# Patient Record
Sex: MALE | Race: WHITE | NOT HISPANIC OR LATINO | Employment: FULL TIME | URBAN - METROPOLITAN AREA
[De-identification: names, ages, dates, MRNs, and addresses within clinical notes are randomized per-mention and may not be internally consistent; named-entity substitution may affect disease eponyms.]

---

## 2024-04-05 ENCOUNTER — APPOINTMENT (OUTPATIENT)
Dept: LAB | Facility: HOSPITAL | Age: 39
End: 2024-04-05
Payer: COMMERCIAL

## 2024-04-05 ENCOUNTER — OFFICE VISIT (OUTPATIENT)
Dept: FAMILY MEDICINE CLINIC | Facility: CLINIC | Age: 39
End: 2024-04-05
Payer: COMMERCIAL

## 2024-04-05 VITALS
HEART RATE: 88 BPM | SYSTOLIC BLOOD PRESSURE: 124 MMHG | BODY MASS INDEX: 38.36 KG/M2 | RESPIRATION RATE: 16 BRPM | DIASTOLIC BLOOD PRESSURE: 88 MMHG | HEIGHT: 76 IN | WEIGHT: 315 LBS

## 2024-04-05 DIAGNOSIS — Z83.3 FAMILY HISTORY OF DIABETES MELLITUS: ICD-10-CM

## 2024-04-05 DIAGNOSIS — R03.0 ELEVATED BP WITHOUT DIAGNOSIS OF HYPERTENSION: ICD-10-CM

## 2024-04-05 DIAGNOSIS — R20.2 PARESTHESIA OF BOTH FEET: ICD-10-CM

## 2024-04-05 DIAGNOSIS — E66.01 MORBID OBESITY DUE TO EXCESS CALORIES (HCC): ICD-10-CM

## 2024-04-05 DIAGNOSIS — L72.3 SEBACEOUS CYST: ICD-10-CM

## 2024-04-05 DIAGNOSIS — R03.0 ELEVATED BP WITHOUT DIAGNOSIS OF HYPERTENSION: Primary | ICD-10-CM

## 2024-04-05 PROBLEM — Z00.00 ENCOUNTER FOR ANNUAL PHYSICAL EXAM: Status: ACTIVE | Noted: 2024-04-05

## 2024-04-05 LAB
ALBUMIN SERPL BCP-MCNC: 4.7 G/DL (ref 3.5–5)
ALP SERPL-CCNC: 67 U/L (ref 34–104)
ALT SERPL W P-5'-P-CCNC: 21 U/L (ref 7–52)
ANION GAP SERPL CALCULATED.3IONS-SCNC: 10 MMOL/L (ref 4–13)
AST SERPL W P-5'-P-CCNC: 18 U/L (ref 13–39)
BASOPHILS # BLD AUTO: 0.02 THOUSANDS/ÂΜL (ref 0–0.1)
BASOPHILS NFR BLD AUTO: 0 % (ref 0–1)
BILIRUB SERPL-MCNC: 1.01 MG/DL (ref 0.2–1)
BUN SERPL-MCNC: 12 MG/DL (ref 5–25)
CALCIUM SERPL-MCNC: 9.8 MG/DL (ref 8.4–10.2)
CHLORIDE SERPL-SCNC: 96 MMOL/L (ref 96–108)
CHOLEST SERPL-MCNC: 178 MG/DL
CO2 SERPL-SCNC: 29 MMOL/L (ref 21–32)
CREAT SERPL-MCNC: 0.84 MG/DL (ref 0.6–1.3)
EOSINOPHIL # BLD AUTO: 0.13 THOUSAND/ÂΜL (ref 0–0.61)
EOSINOPHIL NFR BLD AUTO: 2 % (ref 0–6)
ERYTHROCYTE [DISTWIDTH] IN BLOOD BY AUTOMATED COUNT: 12.2 % (ref 11.6–15.1)
EST. AVERAGE GLUCOSE BLD GHB EST-MCNC: 252 MG/DL
GFR SERPL CREATININE-BSD FRML MDRD: 111 ML/MIN/1.73SQ M
GLUCOSE P FAST SERPL-MCNC: 141 MG/DL (ref 65–99)
HBA1C MFR BLD: 10.4 %
HCT VFR BLD AUTO: 40.3 % (ref 36.5–49.3)
HDLC SERPL-MCNC: 29 MG/DL
HGB BLD-MCNC: 13.4 G/DL (ref 12–17)
IMM GRANULOCYTES # BLD AUTO: 0.01 THOUSAND/UL (ref 0–0.2)
IMM GRANULOCYTES NFR BLD AUTO: 0 % (ref 0–2)
LDLC SERPL CALC-MCNC: 82 MG/DL (ref 0–100)
LYMPHOCYTES # BLD AUTO: 1.93 THOUSANDS/ÂΜL (ref 0.6–4.47)
LYMPHOCYTES NFR BLD AUTO: 34 % (ref 14–44)
MCH RBC QN AUTO: 26.9 PG (ref 26.8–34.3)
MCHC RBC AUTO-ENTMCNC: 33.3 G/DL (ref 31.4–37.4)
MCV RBC AUTO: 81 FL (ref 82–98)
MONOCYTES # BLD AUTO: 0.44 THOUSAND/ÂΜL (ref 0.17–1.22)
MONOCYTES NFR BLD AUTO: 8 % (ref 4–12)
NEUTROPHILS # BLD AUTO: 3.21 THOUSANDS/ÂΜL (ref 1.85–7.62)
NEUTS SEG NFR BLD AUTO: 56 % (ref 43–75)
NONHDLC SERPL-MCNC: 149 MG/DL
NRBC BLD AUTO-RTO: 0 /100 WBCS
PLATELET # BLD AUTO: 363 THOUSANDS/UL (ref 149–390)
PMV BLD AUTO: 9.2 FL (ref 8.9–12.7)
POTASSIUM SERPL-SCNC: 3.8 MMOL/L (ref 3.5–5.3)
PROT SERPL-MCNC: 8 G/DL (ref 6.4–8.4)
RBC # BLD AUTO: 4.99 MILLION/UL (ref 3.88–5.62)
SODIUM SERPL-SCNC: 135 MMOL/L (ref 135–147)
TRIGL SERPL-MCNC: 337 MG/DL
WBC # BLD AUTO: 5.74 THOUSAND/UL (ref 4.31–10.16)

## 2024-04-05 PROCEDURE — 80061 LIPID PANEL: CPT

## 2024-04-05 PROCEDURE — 83036 HEMOGLOBIN GLYCOSYLATED A1C: CPT

## 2024-04-05 PROCEDURE — 99204 OFFICE O/P NEW MOD 45 MIN: CPT | Performed by: FAMILY MEDICINE

## 2024-04-05 PROCEDURE — 36415 COLL VENOUS BLD VENIPUNCTURE: CPT

## 2024-04-05 PROCEDURE — 85025 COMPLETE CBC W/AUTO DIFF WBC: CPT

## 2024-04-05 PROCEDURE — 80053 COMPREHEN METABOLIC PANEL: CPT

## 2024-04-05 NOTE — ASSESSMENT & PLAN NOTE
Blood pressure high on arrival and lower after sitting. Will follow for now. Pt advised to continue low Na diet and to exercise on a regular basis. Will check labs.

## 2024-04-05 NOTE — ASSESSMENT & PLAN NOTE
CPE done. Will check labs. Pt advised to follow a well balanced, heart healthy diet and to exercise on a regular basis.

## 2024-04-05 NOTE — ASSESSMENT & PLAN NOTE
Patient has had it for past 6 weeks. No known diabetes and not a drinker. No back pain. Will check EMG.

## 2024-04-05 NOTE — PROGRESS NOTES
Depression Screening and Follow-up Plan: Patient was screened for depression during today's encounter. They screened negative with a PHQ-2 score of 1.    Assessment/Plan:         Problem List Items Addressed This Visit        Musculoskeletal and Integument    Sebaceous cyst     Patient has 8 cm cyst on right upper back. Will refer to General Surgery for management.          Relevant Orders    Ambulatory Referral to General Surgery       Orthopedic/Musculoskeletal    Paresthesia of both feet     Patient has had it for past 6 weeks. No known diabetes and not a drinker. No back pain. Will check EMG.          Relevant Orders    EMG 2 limb lower extremity       Other    Morbid obesity due to excess calories (HCC)     Discussed smaller portions, healthy snacks, and regular exercise.           Family history of diabetes mellitus     Will check A1C. Advised pt to follow a low carb diet and to exercise on a regular basis.         Relevant Orders    Comprehensive metabolic panel    Hemoglobin A1C    Elevated BP without diagnosis of hypertension - Primary     Blood pressure high on arrival and lower after sitting. Will follow for now. Pt advised to continue low Na diet and to exercise on a regular basis. Will check labs.          Relevant Orders    Lipid panel    Comprehensive metabolic panel    CBC and differential         Subjective:      Patient ID: Arthur Garrison is a 38 y.o. male.    Patient here for new patient exam. Has cyst on upper right back and painful. Hasn't drained yet. Just noticed it a few days ago. Not painful. Patient has also had decreased sensitivity in bilateral feet for past 6 weeks. Getting a little better. Worse in AM. No back pain. Patient has FH of DM. No chest pain or shortness of breath. No headaches. Not a smoker. Rare ETOH use. Exercises at times.         The following portions of the patient's history were reviewed and updated as appropriate:   Past Medical History:  He has no past medical  "history on file.,  _______________________________________________________________________  Medical Problems:  does not have any pertinent problems on file.,  _______________________________________________________________________  Past Surgical History:   has a past surgical history that includes Stella tooth extraction (Bilateral).,  _______________________________________________________________________  Family History:  family history includes Diabetes in his father and mother.,  _______________________________________________________________________  Social History:   reports that he has never smoked. He has never used smokeless tobacco. He reports current alcohol use. He reports that he does not use drugs.,  _______________________________________________________________________  Allergies:  has no allergies on file..  _______________________________________________________________________  No current outpatient medications on file.     No current facility-administered medications for this visit.     _______________________________________________________________________  Review of Systems   Constitutional:  Negative for fatigue and unexpected weight change.   Respiratory:  Negative for cough and shortness of breath.    Cardiovascular:  Negative for chest pain.   Gastrointestinal:  Negative for abdominal pain, constipation, diarrhea and vomiting.   Musculoskeletal:  Negative for arthralgias.   Skin:         Cyst on back    Neurological:  Positive for weakness. Negative for dizziness and headaches.   Psychiatric/Behavioral:  Negative for dysphoric mood. The patient is not nervous/anxious.          Objective:  Vitals:    04/05/24 1336 04/05/24 1358   BP: 136/100 124/88   BP Location: Left arm Left arm   Patient Position: Sitting Sitting   Cuff Size: Large Large   Pulse: 88    Resp: 16    Weight: (!) 152 kg (335 lb)    Height: 6' 3.6\" (1.92 m)      Body mass index is 41.21 kg/m².     Physical Exam  Vitals and " nursing note reviewed.   Constitutional:       Appearance: Normal appearance. He is well-developed. He is obese.   Neck:      Thyroid: No thyromegaly.   Cardiovascular:      Rate and Rhythm: Normal rate and regular rhythm.      Heart sounds: Normal heart sounds. No murmur heard.  Pulmonary:      Effort: Pulmonary effort is normal. No respiratory distress.      Breath sounds: Normal breath sounds. No wheezing.   Musculoskeletal:      Cervical back: Normal range of motion and neck supple.      Right lower leg: No edema.      Left lower leg: No edema.      Comments: 8 cm cyst right lateral thoracic area   Lymphadenopathy:      Cervical: No cervical adenopathy.   Neurological:      Mental Status: He is alert and oriented to person, place, and time.      Cranial Nerves: No cranial nerve deficit.      Sensory: Sensory deficit (b/l feet) present.   Psychiatric:         Mood and Affect: Mood normal.         Behavior: Behavior normal.         Thought Content: Thought content normal.         Judgment: Judgment normal.

## 2024-04-10 ENCOUNTER — OFFICE VISIT (OUTPATIENT)
Dept: SURGERY | Facility: CLINIC | Age: 39
End: 2024-04-10
Payer: COMMERCIAL

## 2024-04-10 VITALS
HEIGHT: 75 IN | HEART RATE: 110 BPM | BODY MASS INDEX: 39.17 KG/M2 | TEMPERATURE: 100 F | DIASTOLIC BLOOD PRESSURE: 78 MMHG | WEIGHT: 315 LBS | OXYGEN SATURATION: 98 % | SYSTOLIC BLOOD PRESSURE: 132 MMHG

## 2024-04-10 DIAGNOSIS — L02.212 BACK ABSCESS: Primary | ICD-10-CM

## 2024-04-10 DIAGNOSIS — L72.3 SEBACEOUS CYST: ICD-10-CM

## 2024-04-10 PROCEDURE — 10061 I&D ABSCESS COMP/MULTIPLE: CPT | Performed by: SURGERY

## 2024-04-10 PROCEDURE — 99204 OFFICE O/P NEW MOD 45 MIN: CPT | Performed by: SURGERY

## 2024-04-10 RX ORDER — AMOXICILLIN AND CLAVULANATE POTASSIUM 875; 125 MG/1; MG/1
1 TABLET, FILM COATED ORAL EVERY 12 HOURS SCHEDULED
Qty: 14 TABLET | Refills: 0 | Status: SHIPPED | OUTPATIENT
Start: 2024-04-10 | End: 2024-04-17

## 2024-04-10 NOTE — PROGRESS NOTES
"Assessment/Plan: Incision and drainage was done under local anesthetic with thorough debridement of the abscess cavity.  Bleeding was controlled with electrocautery.  Packing was done.  Wound care instructions provided.  Antibiotic prescription sent to the pharmacy.  Patient will follow-up in the office in 1 week.    No problem-specific Assessment & Plan notes found for this encounter.       Diagnoses and all orders for this visit:    Back abscess    Sebaceous cyst  -     Ambulatory Referral to General Surgery          Subjective:      Patient ID: Arthur Garrison is a 38 y.o. male.    38-year-old male patient came to my office with complaints of a swelling which is painful over his right upper back.  He says he had a swelling in the past which became bigger and painful and red in last few days.  He does complain of some drainage from the swelling.  No nausea or vomiting.  He had subjective fever.  Patient is not on antibiotics.  He has no known allergies.        The following portions of the patient's history were reviewed and updated as appropriate: allergies, current medications, past family history, past medical history, past social history, past surgical history, and problem list.    Review of Systems   Constitutional: Negative.    HENT: Negative.     Eyes: Negative.    Respiratory: Negative.     Cardiovascular: Negative.    Gastrointestinal: Negative.    Endocrine: Negative.    Genitourinary: Negative.    Musculoskeletal: Negative.    Allergic/Immunologic: Negative.    Neurological: Negative.    Hematological: Negative.    Psychiatric/Behavioral: Negative.           Objective:      /78 (BP Location: Left arm, Patient Position: Sitting, Cuff Size: Standard)   Pulse (!) 110   Temp 100 °F (37.8 °C) (Tympanic)   Ht 6' 3\" (1.905 m)   Wt (!) 153 kg (338 lb)   SpO2 98%   BMI 42.25 kg/m²          Physical Exam  Vitals reviewed.   Constitutional:       Appearance: He is obese.   HENT:      Head: Normocephalic " "and atraumatic.      Nose: Nose normal.      Mouth/Throat:      Mouth: Mucous membranes are moist.   Eyes:      Pupils: Pupils are equal, round, and reactive to light.   Cardiovascular:      Rate and Rhythm: Normal rate.   Pulmonary:      Effort: Pulmonary effort is normal.   Abdominal:      Palpations: Abdomen is soft.   Musculoskeletal:         General: Normal range of motion.      Cervical back: Normal range of motion.        Back:       Comments: About 6 to 7 cm abscess.  There were multiple draining sinuses on the top.  There was a satellite abscess which was superficial.  Superficial cellulitis with induration present in the surrounding area.   Neurological:      Mental Status: He is alert.       Incision and Drainage    Date/Time: 4/10/2024 2:30 PM    Performed by: Omkar Rosario MD  Authorized by: Omkar Rosario MD  Universal Protocol:  Consent: Written consent obtained.  Consent given by: patient  Time out: Immediately prior to procedure a \"time out\" was called to verify the correct patient, procedure, equipment, support staff and site/side marked as required.  Patient understanding: patient states understanding of the procedure being performed  Patient consent: the patient's understanding of the procedure matches consent given  Patient identity confirmed: verbally with patient    Patient location:  Clinic  Location:     Type:  Abscess    Location:  Trunk    Trunk location:  Back  Pre-procedure details:     Skin preparation:  Chloraprep  Anesthesia (see MAR for exact dosages):     Anesthesia method:  Local infiltration    Local anesthetic:  Lidocaine 1% WITH epi  Procedure details:     Complexity:  Complex    Needle aspiration: yes      Incision types:  Single with marsupialization    Scalpel blade:  11    Approach:  Open    Incision depth:  Subcutaneous and deep    Wound management:  Irrigated with saline, probed and deloculated and debrided    Drainage:  Purulent    Drainage amount:  Copious    Wound " treatment:  Wound left open    Packing materials:  Wick placed  Post-procedure details:     Patient tolerance of procedure:  Tolerated well, no immediate complications

## 2024-04-12 ENCOUNTER — OFFICE VISIT (OUTPATIENT)
Dept: FAMILY MEDICINE CLINIC | Facility: CLINIC | Age: 39
End: 2024-04-12
Payer: COMMERCIAL

## 2024-04-12 VITALS
OXYGEN SATURATION: 97 % | HEIGHT: 76 IN | SYSTOLIC BLOOD PRESSURE: 128 MMHG | WEIGHT: 315 LBS | BODY MASS INDEX: 38.36 KG/M2 | HEART RATE: 87 BPM | TEMPERATURE: 98.3 F | DIASTOLIC BLOOD PRESSURE: 82 MMHG

## 2024-04-12 DIAGNOSIS — E11.9 DIABETES MELLITUS WITHOUT COMPLICATION (HCC): Primary | ICD-10-CM

## 2024-04-12 DIAGNOSIS — R20.2 PARESTHESIA OF BOTH FEET: ICD-10-CM

## 2024-04-12 DIAGNOSIS — E78.5 DYSLIPIDEMIA: ICD-10-CM

## 2024-04-12 PROBLEM — Z83.3 FAMILY HISTORY OF DIABETES MELLITUS: Status: RESOLVED | Noted: 2024-04-05 | Resolved: 2024-04-12

## 2024-04-12 PROBLEM — R73.03 PREDIABETES: Status: ACTIVE | Noted: 2024-04-12

## 2024-04-12 PROCEDURE — 99214 OFFICE O/P EST MOD 30 MIN: CPT | Performed by: FAMILY MEDICINE

## 2024-04-12 RX ORDER — METFORMIN HYDROCHLORIDE 750 MG/1
750 TABLET, EXTENDED RELEASE ORAL
Qty: 90 TABLET | Refills: 1 | Status: SHIPPED | OUTPATIENT
Start: 2024-04-12

## 2024-04-12 NOTE — ASSESSMENT & PLAN NOTE
A1C 10.4 in April 2024. Will start medication. Advised pt to follow a low carb diet and to exercise on a regular basis. Foot exam done today. Eye exam is up to date.

## 2024-04-12 NOTE — ASSESSMENT & PLAN NOTE
LDL 82, HDL 29, Tgs 337 in April 2024. Patient likely has Metabolic Syndrome. Advised pt to follow a low cholesterol diet and to exercise on a regular basis. Recheck in 3 months.

## 2024-04-12 NOTE — PATIENT INSTRUCTIONS
Meal Planning with Diabetes Exchanges   AMBULATORY CARE:   Diabetes exchanges  are servings of food that contain similar amounts of carbohydrate, fat, protein, and calories within a food group. The exchanges can be used to develop a healthy meal plan that helps to keep your blood sugar within the recommended levels. A meal plan with the right amount of carbohydrates is especially important. Your blood sugar naturally rises after you eat carbohydrates. Too many carbohydrates in 1 meal or snack can raise your blood sugar level. Carbohydrates are found in starches, fruit, milk, yogurt, and sweets.  Call your doctor or diabetes care provider if:   You have high blood sugar levels during a certain time of day, or almost all of the time.    You often have low blood sugar levels.    You have questions or concerns about your condition or care.    Create a meal plan with exchanges:  A dietitian will work with you to develop a healthy meal plan that is right for you. This meal plan will include the amount of exchanges you can have from each food group throughout the day. Follow your meal plan by keeping track of the amount of exchanges you eat for each meal and snack. Your meal plan will be based on your age, weight, blood sugar levels, medicine, and activity level.  Starch food group exchanges:  Each exchange below contains about 15 grams of carbohydrate , 3 grams of protein, 1 gram of fat, and 80 calories.  1 ounce of white, whole wheat or rye bread (1 slice)    1 ounce of bagel (about ¼ of a bagel)    1 6-inch flour or corn tortilla or 1 4-inch pancake (about ¼ inch thick)    ? cup of cooked pasta or rice    ¾ cup of dry, ready-to-eat cereal with no sugar added    ½ cup of cooked cereal, such as oatmeal    3 kristal cracker squares or 8 animal crackers    6 saltine-type crackers    3 cups of popcorn or ¾ ounce of pretzels    Starchy vegetables and cooked legumes:      ½ cup of corn, green peas, sweet potatoes, or mashed  potatoes    ¼ of a large baked potato    1 cup of acorn, butternut squash, or pumpkin    ½ cup of beans, lentils, or peas (such as zuluaga, kidney, or black-eyed)    ? cup of lima beans    Fruit group exchanges:  Each exchange contains about 15 grams of carbohydrate  and 60 calories.  1 small (4 ounce) apple, banana orange, or nectarine    ½ cup of canned or fresh fruit    ½ cup (4 ounces) of unsweetened fruit juice    2 tablespoons of dried fruit    Milk group exchanges:  Each exchange contains about 12 grams of carbohydrate  and 8 grams of protein. The amount of fat and calories in each serving depends on the type of milk (such as whole, low-fat, or fat-free).  1 cup fat-free or low-fat milk    ¾ cup of plain, nonfat yogurt    1 cup fat-free, flavored yogurt with artificial (no calorie) sweetener    Non-starchy vegetable group exchanges:  Each exchange contains about 5 grams of carbohydrate , 2 grams of protein, and 25 calories. Examples include beets, broccoli, cabbage, carrots, cauliflower, cucumber, mushrooms, tomatoes, and zucchini.  ½ cup of cooked vegetables or 1 cup of raw vegetables    ½ cup of vegetable juice    Meat and meat substitute group exchanges:  Each exchange of a lean meat  listed below contains about 7 grams of protein, 0 to 3 grams of fat, and 45 calories. The meat and meat substitutes food group does not contain any carbohydrates. Medium and high-fat meats have more calories.  1 ounce of chicken or turkey without skin, or 1 ounce of fish (not breaded or fried)    1 ounce of lean beef, pork, or lamb    1-inch cube or 1 ounce of low-fat cheese    2 egg whites or ¼ cup of egg substitute    ½ cup of tofu    Sweets, desserts, and other carbohydrate group exchanges:   Sweets and other desserts:  Each exchange has about 15 grams of carbohydrate .    1 ounce of jonn food cake or 2-inch square cake (unfrosted)    2 small cookies    ½ cup of sugar-free, fat-free ice cream    1 tablespoon of syrup, jam,  jelly, table sugar, or honey    Combination foods:     1 cup of an entrée, such as lasagna, spaghetti with meatballs, macaroni and cheese, and chili with beans (each serving counts as 2 carbohydrate exchanges )    1 cup of tomato or vegetable beef soup (each serving counts as 1 carbohydrate exchange )    Fat group exchanges:  Each exchange contains 5 grams of fat and 45 calories.  1 teaspoon of oil (such as canola, olive, or corn oil)    6 almonds or cashews, 10 peanuts, or 4 pecan halves    2 tablespoons of avocado    ½ tablespoon of peanut butter    1 teaspoon of regular margarine or 2 teaspoons of low-fat margarine    1 teaspoon of regular butter or 1 tablespoon of low-fat butter    1 teaspoon of regular mayonnaise or 1 tablespoon of low-fat mayonnaise    1 tablespoon of regular salad dressing or 2 tablespoons of low-fat salad dressing    Free foods:  The foods on this list are called free foods because they have very few calories. Free foods usually do not increase your blood sugar if you limit them.  1 tablespoon of catsup or taco sauce    ¼ cup of salsa    2 tablespoons of sugar-free syrup or 2 teaspoons of light jam or jelly    1 tablespoon of fat-free salad dressing    4 tablespoons of fat-free margarine or fat-free mayonnaise    Sugar-free drinks: diet soda, sugar-free drink mixes, or mineral water    Low-sodium bouillon or fat-free broth    Mustard    Seasonings such as spices, herbs, and garlic    Sugar-free gelatin without added fruit    Other healthy nutrition guidelines:   Limit drinks with sugar substitutes.  Your dietitian or healthcare provider will encourage you to drink water. Water helps your kidneys to function properly. Ask how much water you should drink every day.    Eat more fiber.  Choose foods that are good sources of fiber, such as fruits, vegetables, and whole grains. Cereals that contain 5 or more grams of fiber per serving are good sources of fiber. Legumes such as garbanzo, zuluaga  beans, kidney beans, and lentils are also good sources.         Limit fat.  Ask your dietitian or healthcare provider how much fat you should eat each day. Choose foods low in fat, saturated fat, trans fat, and cholesterol. Examples include turkey or chicken without the skin, fish, lean cuts of meat, and beans. Low-fat dairy foods, such as low-fat or fat-free milk and low-fat yogurt are also good choices. Omega-3 fatty acids are healthy fats that are found in canola oil, soybean oil and fatty fish. [place], albacore tuna, and sardines are good sources of omega 3 fatty acids. Eat 2 servings of these types of fish each week. Do not eat fried fish.         Limit sugar.  Sugar and sweets must be counted toward the carbohydrate exchanges that you can have within your meal plan. Limit sugar and sweets because they are usually also high in calories and fat. Eat smaller portions of sweets by sharing a dessert or asking for a child-size portion at a restaurant.    Limit sodium  (salt) to about 2,300 mg per day. You may need to eat even less sodium if you have certain medical conditions. Foods high in sodium include soy sauce, potato chips, and soup.         Limit alcohol.  Ask your healthcare provider if it is safe for you to drink alcohol. If alcohol is safe for you to have, eat a meal when you drink alcohol. If you drink alcohol on an empty stomach, your blood sugar may drop to a low level. Women 21 years or older and men 65 years or older should limit alcohol to 1 drink a day. Men aged 21 to 64 years should limit alcohol to 2 drinks a day. A drink of alcohol is 5 ounces of wine, 12 ounces of beer, or 1½ ounces of liquor.    Other ways to manage diabetes:   Control your blood sugar level.  Test your blood sugar level regularly and keep a record of the results. Ask your healthcare provider when and how often to test your blood sugar. You may need to check your blood sugar level at least 3 times each day.    Talk to your  healthcare provider about your weight.  Ask if you need to lose weight, and how much you need to lose. If you are overweight, you may need to make other changes to lose weight. Ask your healthcare provider to help you create a weight loss program.    Get regular physical activity.  Physical activity can help decrease your blood sugar level. It can also help to decrease your risk for heart disease and help you lose weight. Adults should have moderate intensity physical activity for at least 150 minutes every week. Spread the amount of activity over at least 3 days a week. Do not skip more than 2 days in a row. Children should get at least 60 minutes of moderate physical activity on most days of the week. Examples of moderate physical activity include brisk walking, running, and swimming. Do not sit for longer than 30 minutes. Work with your healthcare provider to create a plan for physical activity.       © Copyright [employer] 2023 Information is for End User's use only and may not be sold, redistributed or otherwise used for commercial purposes.  The above information is an  only. It is not intended as medical advice for individual conditions or treatments. Talk to your doctor, nurse or pharmacist before following any medical regimen to see if it is safe and effective for you.

## 2024-04-12 NOTE — PROGRESS NOTES
Depression Screening and Follow-up Plan: Patient was screened for depression during today's encounter. They screened negative with a PHQ-2 score of 0.    Assessment/Plan:         Problem List Items Addressed This Visit        Endocrine    Diabetes mellitus without complication (HCC) - Primary     A1C 10.4 in April 2024. Will start medication. Advised pt to follow a low carb diet and to exercise on a regular basis. Foot exam done today. Eye exam is up to date.          Relevant Medications    metFORMIN (GLUCOPHAGE-XR) 750 mg 24 hr tablet       Orthopedic/Musculoskeletal    Paresthesia of both feet     No change and could be due to DM. Will check EMG.             Other    Dyslipidemia     LDL 82, HDL 29, Tgs 337 in April 2024. Patient likely has Metabolic Syndrome. Advised pt to follow a low cholesterol diet and to exercise on a regular basis. Recheck in 3 months.               Subjective:      Patient ID: Arthur Garrison is a 38 y.o. male.    Patient here to follow-up new onset DM. Patient had labs done and A1C 10.4. Patient has FH of DM in both parents. Patient denies excessive urination or thirst. Doesn't get up during the night to urinate. No chest pain or shortness of breath. No headaches. No abdominal pain.         The following portions of the patient's history were reviewed and updated as appropriate:   Past Medical History:  He has a past medical history of Obesity (8/1985).,  _______________________________________________________________________  Medical Problems:  does not have any pertinent problems on file.,  _______________________________________________________________________  Past Surgical History:   has a past surgical history that includes [place] tooth extraction (Bilateral).,  _______________________________________________________________________  Family History:  family history includes Diabetes in his father and mother; Hypertension in his father and  "mother.,  _______________________________________________________________________  Social History:   reports that he has never smoked. He has never used smokeless tobacco. He reports that he does not currently use alcohol after a past usage of about 3.0 standard drinks of alcohol per week. He reports that he does not use drugs.,  _______________________________________________________________________  Allergies:  has No Known Allergies..  _______________________________________________________________________  Current Outpatient Medications   Medication Sig Dispense Refill   • amoxicillin-clavulanate (AUGMENTIN) 875-125 mg per tablet Take 1 tablet by mouth every 12 (twelve) hours for 7 days 14 tablet 0   • metFORMIN (GLUCOPHAGE-XR) 750 mg 24 hr tablet Take 1 tablet (750 mg total) by mouth daily with breakfast 90 tablet 1     No current facility-administered medications for this visit.     _______________________________________________________________________  Review of Systems   Constitutional:  Negative for fatigue and unexpected weight change.   Eyes:  Negative for visual disturbance.   Respiratory:  Negative for chest tightness and shortness of breath.    Cardiovascular:  Negative for chest pain and palpitations.   Gastrointestinal:  Negative for abdominal pain, constipation, diarrhea, nausea and vomiting.   Endocrine: Negative for polydipsia, polyphagia and polyuria.   Genitourinary:  Negative for frequency.   Musculoskeletal:  Negative for arthralgias.   Skin:  Negative for rash and wound.   Neurological:  Negative for numbness.         Objective:  Vitals:    04/12/24 1512   BP: 128/82   BP Location: Left arm   Patient Position: Sitting   Cuff Size: Standard   Pulse: 87   Temp: 98.3 °F (36.8 °C)   TempSrc: Tympanic   SpO2: 97%   Weight: (!) 153 kg (338 lb)   Height: 6' 3.6\" (1.92 m)     Body mass index is 41.58 kg/m².     Physical Exam  Vitals and nursing note reviewed.   Constitutional:       Appearance: " Normal appearance. He is well-developed. He is obese.   Neck:      Thyroid: No thyromegaly.   Cardiovascular:      Rate and Rhythm: Normal rate and regular rhythm.      Pulses: no weak pulses.      Heart sounds: Normal heart sounds. No murmur heard.  Pulmonary:      Effort: Pulmonary effort is normal. No respiratory distress.      Breath sounds: Normal breath sounds. No wheezing.   Musculoskeletal:      Cervical back: Normal range of motion and neck supple.      Right lower leg: No edema.      Left lower leg: No edema.   Feet:      Right foot:      Skin integrity: No ulcer, skin breakdown, erythema, warmth, callus or dry skin.      Left foot:      Skin integrity: No ulcer, skin breakdown, erythema, warmth, callus or dry skin.   Lymphadenopathy:      Cervical: No cervical adenopathy.   Neurological:      Mental Status: He is alert and oriented to person, place, and time.      Cranial Nerves: No cranial nerve deficit.      Sensory: Sensory deficit (b/l feet) present.   Psychiatric:         Mood and Affect: Mood normal.         Behavior: Behavior normal.         Thought Content: Thought content normal.         Judgment: Judgment normal.       Patient's shoes and socks removed.    Right Foot/Ankle   Right Foot Inspection  Skin Exam: skin normal and skin intact. No dry skin, no warmth, no callus, no erythema, no maceration, no abnormal color, no pre-ulcer, no ulcer and no callus.     Sensory   Proprioception: diminished      Vascular  Capillary refills: < 3 seconds      Left Foot/Ankle  Left Foot Inspection  Skin Exam: skin normal and skin intact. No dry skin, no warmth, no erythema, no maceration, normal color, no pre-ulcer, no ulcer and no callus.     Sensory   Proprioception: diminished      Vascular  Capillary refills: < 3 seconds      Assign Risk Category  No deformity present  Loss of protective sensation  No weak pulses  Risk: 1

## 2024-04-18 ENCOUNTER — OFFICE VISIT (OUTPATIENT)
Dept: SURGERY | Facility: CLINIC | Age: 39
End: 2024-04-18

## 2024-04-18 VITALS
WEIGHT: 315 LBS | OXYGEN SATURATION: 96 % | HEIGHT: 75 IN | SYSTOLIC BLOOD PRESSURE: 132 MMHG | HEART RATE: 75 BPM | DIASTOLIC BLOOD PRESSURE: 78 MMHG | TEMPERATURE: 98.4 F | BODY MASS INDEX: 39.17 KG/M2

## 2024-04-18 DIAGNOSIS — L72.3 SEBACEOUS CYST: Primary | ICD-10-CM

## 2024-04-18 PROCEDURE — 99024 POSTOP FOLLOW-UP VISIT: CPT | Performed by: SURGERY

## 2024-04-18 NOTE — PROGRESS NOTES
First postoperative visit after excision of a infected sebaceous cyst.  Actually this was probably more of an incision and drainage than anything else.  From reading Omkar's notes this was exceptionally large.  The patient has followed the instructions and taking the antibiotics.  On examination the wound looks great he still has a good 4 cm opening by about a centimeter and a half but it is mostly granulating in the middle.  There is no surrounding redness or fluctuance.  I told the patient to continue present care and come back and see Dr. Rosario in a month.  My guess is this will be easily closed by that

## 2024-04-18 NOTE — LETTER
April 18, 2024     Berry Quiles MD  [address]  [address]  [place] [address]    Patient: Arthur Garrison   YOB: 1985   Date of Visit: 4/18/2024       Dear Dr. Quiles:    Thank you for referring Arthur Garrison to me for evaluation. Below are my notes for this consultation.    If you have questions, please do not hesitate to call me. I look forward to following your patient along with you.         Sincerely,        Robert Bloch, MD        CC: No Recipients

## 2024-05-22 ENCOUNTER — OFFICE VISIT (OUTPATIENT)
Dept: SURGERY | Facility: CLINIC | Age: 39
End: 2024-05-22

## 2024-05-22 VITALS
HEIGHT: 75 IN | HEART RATE: 99 BPM | WEIGHT: 315 LBS | OXYGEN SATURATION: 96 % | SYSTOLIC BLOOD PRESSURE: 124 MMHG | DIASTOLIC BLOOD PRESSURE: 90 MMHG | BODY MASS INDEX: 39.17 KG/M2 | TEMPERATURE: 97.8 F

## 2024-05-22 DIAGNOSIS — L02.212 BACK ABSCESS: Primary | ICD-10-CM

## 2024-05-22 PROCEDURE — 99024 POSTOP FOLLOW-UP VISIT: CPT | Performed by: SURGERY

## 2024-05-22 NOTE — PROGRESS NOTES
38-year-old male patient who is more than 1 month status post incision and drainage of a large back abscess.  He says he is doing well.  He says there is no drainage.  He says no fever.  No pain.  He says he has stopped covering the area now.    On clinical exam he is alert awake oriented.  Vitals are stable.  The incision looks healthy.  There is good granulation tissue and epithelization.  The size is about 3 cm x 0.5 cm.    The incision is good.  He is healing well.  Follow-up with me as needed.

## 2024-08-20 ENCOUNTER — TELEPHONE (OUTPATIENT)
Dept: INTERNAL MEDICINE CLINIC | Facility: CLINIC | Age: 39
End: 2024-08-20

## 2024-10-30 ENCOUNTER — HOSPITAL ENCOUNTER (OUTPATIENT)
Dept: NEUROLOGY | Facility: CLINIC | Age: 39
Discharge: HOME | End: 2024-10-30
Payer: COMMERCIAL

## 2024-10-30 DIAGNOSIS — R20.2 PARESTHESIA OF BOTH FEET: ICD-10-CM

## 2024-10-30 PROBLEM — G62.89 AXONAL SENSORIMOTOR NEUROPATHY: Status: ACTIVE | Noted: 2024-10-30

## 2024-10-30 PROCEDURE — 95886 MUSC TEST DONE W/N TEST COMP: CPT | Performed by: PSYCHIATRY & NEUROLOGY

## 2024-10-30 PROCEDURE — 95913 NRV CNDJ TEST 13/> STUDIES: CPT | Performed by: PSYCHIATRY & NEUROLOGY

## 2024-11-13 DIAGNOSIS — E11.9 DIABETES MELLITUS WITHOUT COMPLICATION (HCC): ICD-10-CM

## 2024-11-13 RX ORDER — METFORMIN HYDROCHLORIDE 750 MG/1
750 TABLET, EXTENDED RELEASE ORAL
Qty: 30 TABLET | Refills: 5 | Status: SHIPPED | OUTPATIENT
Start: 2024-11-13

## 2024-11-14 ENCOUNTER — OFFICE VISIT (OUTPATIENT)
Dept: FAMILY MEDICINE CLINIC | Facility: CLINIC | Age: 39
End: 2024-11-14
Payer: COMMERCIAL

## 2024-11-14 VITALS
HEIGHT: 75 IN | BODY MASS INDEX: 39.17 KG/M2 | SYSTOLIC BLOOD PRESSURE: 122 MMHG | OXYGEN SATURATION: 98 % | DIASTOLIC BLOOD PRESSURE: 86 MMHG | RESPIRATION RATE: 16 BRPM | WEIGHT: 315 LBS | HEART RATE: 90 BPM

## 2024-11-14 DIAGNOSIS — G62.89 AXONAL SENSORIMOTOR NEUROPATHY: ICD-10-CM

## 2024-11-14 DIAGNOSIS — E78.5 DYSLIPIDEMIA: ICD-10-CM

## 2024-11-14 DIAGNOSIS — E11.9 DIABETES MELLITUS WITHOUT COMPLICATION (HCC): Primary | ICD-10-CM

## 2024-11-14 PROBLEM — L72.3 SEBACEOUS CYST: Status: RESOLVED | Noted: 2024-04-05 | Resolved: 2024-11-14

## 2024-11-14 LAB — SL AMB POCT HEMOGLOBIN AIC: 10.6 (ref ?–6.5)

## 2024-11-14 PROCEDURE — 99214 OFFICE O/P EST MOD 30 MIN: CPT | Performed by: FAMILY MEDICINE

## 2024-11-14 PROCEDURE — 83036 HEMOGLOBIN GLYCOSYLATED A1C: CPT | Performed by: FAMILY MEDICINE

## 2024-11-14 RX ORDER — TIRZEPATIDE 5 MG/.5ML
5 INJECTION, SOLUTION SUBCUTANEOUS WEEKLY
Qty: 2 ML | Refills: 1 | Status: SHIPPED | OUTPATIENT
Start: 2024-12-14

## 2024-11-14 RX ORDER — TIRZEPATIDE 2.5 MG/.5ML
2.5 INJECTION, SOLUTION SUBCUTANEOUS WEEKLY
Qty: 2 ML | Refills: 0 | Status: SHIPPED | OUTPATIENT
Start: 2024-11-14

## 2024-11-14 NOTE — ASSESSMENT & PLAN NOTE
A1C done today and was 10.6. Will add Mounjaro 2.5 mg weekly for 4 weeks and then increase to 5 mg weekly. Continue metformin  mg daily. Advised pt to follow a low carb diet and to exercise on a regular basis. Foot exam done in April 2024. Eye exam is up to date per patient.     Orders:    POCT hemoglobin A1c    Mounjaro 2.5 MG/0.5ML SOAJ; Inject 2.5 mg under the skin once a week    Mounjaro 5 MG/0.5ML SOAJ; Inject 5 mg under the skin once a week Do not start before December 14, 2024.

## 2024-11-14 NOTE — PROGRESS NOTES
Name: Arthur Garrison      : 1985      MRN: [id number]  Encounter Provider: Berry Quiles MD  Encounter Date: 2024   Encounter department: [facility]    Assessment & Plan  Diabetes mellitus without complication (HCC)  A1C done today and was 10.6. Will add Mounjaro 2.5 mg weekly for 4 weeks and then increase to 5 mg weekly. Continue metformin  mg daily. Advised pt to follow a low carb diet and to exercise on a regular basis. Foot exam done in 2024. Eye exam is up to date per patient.     Orders:    POCT hemoglobin A1c    Mounjaro 2.5 MG/0.5ML SOAJ; Inject 2.5 mg under the skin once a week    Mounjaro 5 MG/0.5ML SOAJ; Inject 5 mg under the skin once a week Do not start before 2024.    Axonal sensorimotor neuropathy  Patient had EMG in 2024 and likely due to his diabetes.          Dyslipidemia  Recheck lipids. Advised pt to follow a low cholesterol diet and to exercise on a regular basis.           Depression Screening and Follow-up Plan: Patient was screened for depression during today's encounter. They screened negative with a PHQ-2 score of 0.        History of Present Illness     Patient here for follow-up DM, Hyperlipidemia, Neuropathy. Patient doing ok. No chest pain or shortness of breath. No headaches. Patient walks at times. Not great with diet. Patient takes metformin  mg daily.Patient had EMG in 2024 and neuropathy is likely from his diabetes.  Patient going to be moving to [place] in 2 weeks and will follow-up there.       Review of Systems   Constitutional:  Negative for fatigue and unexpected weight change.   Eyes:  Negative for visual disturbance.   Respiratory:  Negative for chest tightness and shortness of breath.    Cardiovascular:  Negative for chest pain and palpitations.   Gastrointestinal:  Negative for abdominal pain, constipation, diarrhea, nausea and vomiting.   Endocrine: Negative for polydipsia, polyphagia and  "polyuria.   Genitourinary:  Negative for frequency.   Musculoskeletal:  Negative for arthralgias.   Skin:  Negative for rash and wound.   Neurological:  Positive for numbness.     Past Medical History:   Diagnosis Date    Obesity 8/1985    Been large my whole life     Past Surgical History:   Procedure Laterality Date    [place] TOOTH EXTRACTION Bilateral      Family History   Problem Relation Age of Onset    Diabetes Mother     Hypertension Mother     Diabetes Father     Hypertension Father      Social History     Tobacco Use    Smoking status: Never    Smokeless tobacco: Never   Vaping Use    Vaping status: Never Used   Substance and Sexual Activity    Alcohol use: Not Currently     Alcohol/week: 3.0 standard drinks of alcohol     Types: 3 Cans of beer per week     Comment: rare    Drug use: Never    Sexual activity: Not Currently     Partners: Female     Birth control/protection: Condom Male     Current Outpatient Medications on File Prior to Visit   Medication Sig    metFORMIN (GLUCOPHAGE-XR) 750 mg 24 hr tablet TAKE 1 TABLET BY MOUTH EVERY DAY WITH BREAKFAST     No Known Allergies  Immunization History   Administered Date(s) Administered    COVID-19 MODERNA VACC 0.5 ML IM 03/13/2021, 04/10/2021, 12/17/2021     Objective   /86 (BP Location: Left arm, Patient Position: Sitting, Cuff Size: Large)   Pulse 90   Resp 16   Ht 6' 3\" (1.905 m)   Wt (!) 157 kg (346 lb)   SpO2 98%   BMI 43.25 kg/m²     Physical Exam  Vitals and nursing note reviewed.   Constitutional:       Appearance: Normal appearance. He is obese.   Neck:      Vascular: No carotid bruit.   Cardiovascular:      Rate and Rhythm: Normal rate and regular rhythm.      Heart sounds: Normal heart sounds. No murmur heard.  Pulmonary:      Effort: Pulmonary effort is normal.      Breath sounds: Normal breath sounds. No wheezing.   Musculoskeletal:      Cervical back: Normal range of motion and neck supple. No muscular tenderness.      Right lower " leg: No edema.      Left lower leg: No edema.   Lymphadenopathy:      Cervical: No cervical adenopathy.   Neurological:      Mental Status: He is alert.      Sensory: Sensory deficit (b/l feet) present.   Psychiatric:         Mood and Affect: Mood normal.         Behavior: Behavior normal.         Thought Content: Thought content normal.         Judgment: Judgment normal.

## 2024-11-14 NOTE — ASSESSMENT & PLAN NOTE
Recheck lipids. Advised pt to follow a low cholesterol diet and to exercise on a regular basis.

## 2024-11-17 ENCOUNTER — TELEPHONE (OUTPATIENT)
Age: 39
End: 2024-11-17

## 2024-11-17 NOTE — TELEPHONE ENCOUNTER
PA for Mounjaro 2.5 MG/0.5ML SOAJ SUBMITTED to     via    []CMM-KEY:   [x]Surescripts-Case ID # [id number]   []Availity-Auth ID # NDC #   []Faxed to plan   []Other website   []Phone call Case ID #     [x]PA sent as URGENT    All office notes, labs and other pertaining documents and studies sent. Clinical questions answered. Awaiting determination from insurance company.     Turnaround time for your insurance to make a decision on your Prior Authorization can take 7-21 business days.

## 2024-11-18 NOTE — TELEPHONE ENCOUNTER
PA Mounjaro 2.5 MG/0.5ML APPROVED         Patient advised by          []MyChart Message  []Phone call   [x]LMOM  []L/M to call office as no active Communication consent on file  []Unable to leave detailed message as VM not approved on Communication consent       Pharmacy advised by    [x]Fax  []Phone call

## 2024-12-14 DIAGNOSIS — E11.9 DIABETES MELLITUS WITHOUT COMPLICATION (HCC): ICD-10-CM

## 2024-12-16 RX ORDER — METFORMIN HYDROCHLORIDE 750 MG/1
750 TABLET, EXTENDED RELEASE ORAL
Qty: 90 TABLET | Refills: 1 | Status: SHIPPED | OUTPATIENT
Start: 2024-12-16

## 2025-02-03 DIAGNOSIS — E11.9 DIABETES MELLITUS WITHOUT COMPLICATION (HCC): ICD-10-CM

## 2025-02-04 RX ORDER — TIRZEPATIDE 5 MG/.5ML
5 INJECTION, SOLUTION SUBCUTANEOUS WEEKLY
Qty: 2 ML | Refills: 0 | Status: SHIPPED | OUTPATIENT
Start: 2025-02-04

## 2025-02-18 DIAGNOSIS — E11.9 DIABETES MELLITUS WITHOUT COMPLICATION (HCC): ICD-10-CM

## 2025-02-20 RX ORDER — TIRZEPATIDE 5 MG/.5ML
5 INJECTION, SOLUTION SUBCUTANEOUS WEEKLY
Qty: 2 ML | Refills: 0 | Status: SHIPPED | OUTPATIENT
Start: 2025-02-20

## 2025-03-10 DIAGNOSIS — E11.9 DIABETES MELLITUS WITHOUT COMPLICATION (HCC): ICD-10-CM

## 2025-03-11 RX ORDER — TIRZEPATIDE 5 MG/.5ML
5 INJECTION, SOLUTION SUBCUTANEOUS WEEKLY
Qty: 6 ML | Refills: 0 | Status: SHIPPED | OUTPATIENT
Start: 2025-03-11

## 2025-06-02 ENCOUNTER — VBI (OUTPATIENT)
Dept: ADMINISTRATIVE | Facility: OTHER | Age: 40
End: 2025-06-02

## 2025-06-02 NOTE — TELEPHONE ENCOUNTER
06/02/25 2:29 PM    Patient was called to schedule a diabetic follow up apointment .Patient moved out of area/state.     Thank you.  Ludwig Doll MA  [facility]

## 2025-06-02 NOTE — TELEPHONE ENCOUNTER
06/02/25 2:17 PM    Patient was called to schedule a diabetic follow up apointment ; a message was left for the patient to return the call.    Thank you.  Ludwig Doll MA  [facility]